# Patient Record
Sex: FEMALE | Race: BLACK OR AFRICAN AMERICAN | URBAN - METROPOLITAN AREA
[De-identification: names, ages, dates, MRNs, and addresses within clinical notes are randomized per-mention and may not be internally consistent; named-entity substitution may affect disease eponyms.]

---

## 2019-08-01 ENCOUNTER — OFFICE VISIT (OUTPATIENT)
Dept: ORTHOPEDIC SURGERY | Facility: CLINIC | Age: 39
End: 2019-08-01

## 2019-08-01 VITALS
OXYGEN SATURATION: 100 % | HEART RATE: 87 BPM | DIASTOLIC BLOOD PRESSURE: 90 MMHG | SYSTOLIC BLOOD PRESSURE: 133 MMHG | HEIGHT: 70 IN | BODY MASS INDEX: 27.8 KG/M2 | TEMPERATURE: 98.1 F | WEIGHT: 194.2 LBS | RESPIRATION RATE: 18 BRPM

## 2019-08-01 DIAGNOSIS — M25.551 PAIN IN RIGHT HIP: ICD-10-CM

## 2019-08-01 DIAGNOSIS — M25.651 DECREASED RANGE OF RIGHT HIP MOVEMENT: ICD-10-CM

## 2019-08-01 DIAGNOSIS — M70.61 TROCHANTERIC BURSITIS OF RIGHT HIP: Primary | ICD-10-CM

## 2019-08-01 RX ORDER — TRIAMCINOLONE ACETONIDE 40 MG/ML
40 INJECTION, SUSPENSION INTRA-ARTICULAR; INTRAMUSCULAR ONCE
Qty: 1 ML | Refills: 0
Start: 2019-08-01 | End: 2019-08-01

## 2019-08-01 NOTE — PROGRESS NOTES
HISTORY OF PRESENT ILLNESS:  Lilly Hernandez is a pleasant, 69-year-old, -American female who presents to the office with a one-week history of worsening right hip pain. While she was at work on July 26, 2019, working at the [employer], she developed pain over the outer portion of her right hip. There was no trauma reported. She did follow with [address]  and had x-rays done of the right hip on July 26, 2019, that were normal with no stress fracture suspected and no osseous lesions determined. She proceeded to take Motrin as provided by the ER physician for her symptoms that really did not help. On July 29, 2019, as she had not experienced any significant relief from her discomfort, she presented to the local [facility] and was examined to find no abnormalities associated with the right hip externally. She was placed on a Medrol Dosepak and also recommended nonsteroidals. She has some difficulties with the Medrol Dosepak causing nausea and was given what she thinks is Promethazine/Phenergan for her nausea/vomiting. She has not taken the Prednisone as directed and essentially has maintained symptoms of dull and achy sensation at rest to the right hip, and with activity the pain increases to upwards of an 8-9/10. She has pain when she sits from a standing position and stands from a seated position. FAMILY HISTORY:  Noncontributory. ALLERGIES:  None. REVIEW OF SYSTEMS:  No chest pain. No shortness of breath. No fever, chills, or night sweats. No rash, no itching. No nausea and no vomiting. Pain to the right hip is noted in the HPI. PHYSICAL EXAM:  She is a healthy-appearing, well-developed, well-nourished, pleasant, 69-year-old, -American female atraumatic, normocephalic, alert, and oriented times three, sitting on the table comfortably. She lies left recumbent facing the doorway with no problems.   I am joined by Che Beach, as my chaperone. The patient's  is present. Examination to the right hip reveals the skin is intact. There is no evidence of skin breakdown or infection. There is point tenderness over the greater trochanteric region with radiation distal through the IT band measuring 10 centimeters from the point of maximal tenderness. Her passive internal and external rotation of the right hip is noted at 8ø and 12ø with pain reproduced in both planes of motion to the point of maximal tenderness. Distal sensation is intact fully to the right lower extremity. Capillary refill is brisk and less than 2 seconds to the right lower extremity. RADIOGRAPHS:  X-rays from imaging done, review of report dated July 26, 2019, at [facility] reveal intact and normally aligned hip. No fractures or dislocations. Soft tissues are unremarkable. IMPRESSION:      1. Trochanteric bursitis right hip. 2. Overuse syndrome of the right hip. 3. Decreased range of motion of the right hip secondary to above. PROCEDURE:  Today, using sterile technique after verbal and written consent were obtained and a brief time out performed, with the patient lying left recumbent facing outwards to the door, and as I am joined by Janet Hale LPN, as my chaperone, and with the right hip draped accordingly and the hip identified at the point of maximal tenderness 1 cc of Kenalog at 40 mg per mL  mixed with 7 mL of Sensorcaine 0.75% was injected. There were no complications. The patient tolerated the procedure well. PLAN:   I am currently recommending a low-dose cortisone injection for symptoms of trochanteric bursitis of the right hip. We are going to follow her back on a prn basis. Trochanteric bursitis stretching and home exercise program were ordered today, and a note for work to remain out until August 12, 2019, was written. Today, all of her and her 's questions were answered to their satisfaction.

## 2019-08-07 ENCOUNTER — DOCUMENTATION ONLY (OUTPATIENT)
Dept: ORTHOPEDIC SURGERY | Facility: CLINIC | Age: 39
End: 2019-08-07

## 2019-08-07 NOTE — PROGRESS NOTES
Patient dropped off Healthcare Provider Request Form to be completed and faxed to [phone] at [facility] office. Patient can be reached at [phone].

## 2019-08-13 ENCOUNTER — TELEPHONE (OUTPATIENT)
Dept: ORTHOPEDIC SURGERY | Facility: CLINIC | Age: 39
End: 2019-08-13

## 2019-08-13 RX ORDER — DICLOFENAC SODIUM 75 MG/1
75 TABLET, DELAYED RELEASE ORAL 2 TIMES DAILY
Qty: 60 TAB | Refills: 1 | Status: SHIPPED | OUTPATIENT
Start: 2019-08-13

## 2019-08-13 NOTE — TELEPHONE ENCOUNTER
Patient calling to inform Lauren Vince that she is starting to feel a little more pain in her rt hip. The Motrin 600mg which she has been taking for a long time is not relieving her pain now. Patient cannot take [address] which she has taken in the past as it affects her stomach, very nauseated  Can a different pain medication be prescribed for her. Her pharmacy is [facility] [phone].  Also the physical therapy is not scheduled to start till 08/19, but on a waiting list. Please call her back at [phone]

## 2019-08-19 ENCOUNTER — HOSPITAL ENCOUNTER (OUTPATIENT)
Dept: PHYSICAL THERAPY | Age: 39
Discharge: HOME | End: 2019-08-19
Payer: COMMERCIAL

## 2019-08-19 PROCEDURE — 97161 PT EVAL LOW COMPLEX 20 MIN: CPT

## 2019-08-19 PROCEDURE — 97110 THERAPEUTIC EXERCISES: CPT

## 2019-08-19 NOTE — PROGRESS NOTES
PT DAILY TREATMENT NOTE/HIP EVAL 10-18    Patient Name: Zeynep Tran  Date:2019  : 1980  [x]  Patient  Verified  Payor: BLUE CROSS / Plan: [facility] / Product Type: PPO /    In time:901  Out time:938  Total Treatment Time (min): 37  Visit #: 1 of 8    Medicare/BCBS Only   Total Timed Codes (min):  37 1:1 Treatment Time:  23     Treatment Area: Pain in right hip [M25.551]    SUBJECTIVE  Pain Level (0-10 scale): current = 2/10, worst = 3-4/10, best = 0-1/10  Aggravating = sitting a long time, prolonged standing/walking  Alleviating = stretching   []constant []intermittent []improving []worsening []no change since onset    Any medication changes, allergies to medications, adverse drug reactions, diagnosis change, or new procedure performed?: [x] No    [] Yes (see summary sheet for update)  Subjective functional status/changes:     HPI: Pt reports to PT with right hip pain that began 19. Pt had recently changed jobs to working in an [employer], doubling her shift length from [address] to [address]. Pt was at work when the pain began and thought that maybe it was from her knee that has \"suble OA. \" Pt began wearing her knee brace to manage her pain, but it began radiating up and down her right leg. After a few days, pt had a break during work and decided to leave due to pain. Pt went to ER where she was given 600mg of motrin, xray, and a release to work in 2 days. Pt felt that she was not going to be able to go back to work at that time, so she followed up with the MD. Mark Baires thought I wanted stronger medication but I just wanted a referred to a specialist.\"  Pt then went to urgent care who diagnosed her with bursitis - they gave her a \"steroid pack and norco.\" She was not able to take either and threw up the norco.  On  pt went to see Gera Camejo, who stopped the steroids and gave her a Cortizone injection in the hip which made her feel better a few days.  Pt reports by the following Sunday she was able to walk without her cane. Pt states prolonged driving/sitting still causes stiffness and pain in the leg - she will need to get out of the car or stand up from sewing to stretch which helps alleviate the discomfort. Pt now stretches in the AM, PM and throughout the day. Pt has resigned from [employer] and will be  starting at [employer] on 8/23/19 and will do mostly sitting work     PMHx/Surgical Hx: none  Work Hx: between jobs  Pt Goals: \"learn how to keep this from reoccurring\"    OBJECTIVE/EXAMINATION    14 min [x]Eval                  []Re-Eval       23 min Therapeutic Exercise:  [x] See flow sheet : explanation, demonstration, performance and distribution of HEP   Rationale: increase ROM and increase strength to improve the patients ability to improve ability to perform work duties.           With   [] TE   [] TA   [] neuro   [] other: Patient Education: [x] Review HEP    [] Progressed/Changed HEP based on:   [] positioning   [] body mechanics   [] transfers   [] heat/ice application    [] other:      Physical Therapy Evaluation - HIP     Gait: WNL    ROM / Strength                            Strength (1-5/5)           AROM   PROM    Right Left Right Left Right Left   Hip Flexion 4 4        ER 4 4        IR 4 4        Abduction 4 4        Adduction 4 4        Extension 4 4       Knee Extension 5- 5-        Flexion 4 4       Ankle Dorsiflexion 5 5        Plantarflexion         NOTES:  Hip AROM WFL without pain    Flexibility:    Ely = positive bilaterally for quad tightness   90-90 Popliteal angle = negative bilaterally for hamstring tightness    Palpation:   TTP through right gluts - over piriformis and ER group; no TTP over greater trochanter or TFL    Special Tests:   Right Left   Gillet negative negative   Long Sit      Scour negative negative   OSCAR negative negative   FADIR negative negative   Arturo's                    Pain Level (0-10 scale) post treatment: 0    ASSESSMENT/Changes in Function: see POC    Patient will continue to benefit from skilled PT services to modify and progress therapeutic interventions, address functional mobility deficits, address ROM deficits, address strength deficits, analyze and address soft tissue restrictions, analyze and cue movement patterns, analyze and modify body mechanics/ergonomics and assess and modify postural abnormalities to attain remaining goals. []  See Plan of Care  []  See progress note/recertification  []  See Discharge Summary         Progress towards goals / Updated goals:  Short Term Goals: To be accomplished in 2 weeks:  1. Pt will demonstrate (I) with HEP as evidenced by performance in clinic with minimal instruction in order to supplement PT treatment   Eval = established  2. Pt will report ability to sit for 1 hour with reports of max 2/10 pain. Eval = ~40minutes    Long Term Goals: To be accomplished in 8 treatments:  1. Pt will demonstrate bilateral hip abduction/extension strength minimum 5-/5 in order to improve ambulation distances in the community    Eval: bilaterally = 4/5 for ext and abduction  2. Pt will score at least 78 on FOTO in order to improve overall function, decrease pain, and facilitate return to PLOF. Eval = 67  3. Pt will demonstrate negative ely stretch on the right  in order to reduce static tension across hip to improve ability to tolerate prolonged sitting/standing.     Eval = positive bilaterally     PLAN  [x]  Upgrade activities as tolerated     [x]  Continue plan of care  []  Update interventions per flow sheet       []  Discharge due to:_  []  Other:_      Cortney Lowery, PT 8/19/2019  9:01 AM

## 2019-08-19 NOTE — PROGRESS NOTES
[facility] [place] [address]  [address] [facility] [address], [address] [phone] fax    Plan of Care/ Statement of Necessity for Physical Therapy Services           Patient name: Felicity Acosta Start of Care: 2019   Referral source: Teagan Etienne : 1980    Medical Diagnosis: Pain in right hip [M25.551]  Payor: BLUE CROSS / Plan: [address] [facility] [place] [address] / Product Type: PPO /  Onset Date:19    Treatment Diagnosis: right hip pain   Prior Hospitalization: see medical history Provider#: [id number]   Medications: Verified on Patient summary List    Comorbidities: none   Prior Level of Function: functionally (I); previously worked in an [employer] 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 Assessment/ key information:   Ms. Bang Monzon is a 43yo female who presents to PT with complaints of right hip pain and diagnosis of greater trochanteric bursitis. Pt received an injection on 19 that has managed the majority of her symptoms, but pt continues to demonstrate decreased hip strength and flexibility with piriformis tenderness to palpation at evaluation today. Pt continues to have increased pain with prolonged sitting during driving and sewing.  Pt will benefit from skilled PT in order to address listed deficits, decrease pain, and maximize functional potential.    Evaluation Complexity History LOW Complexity : Zero comorbidities / personal factors that will impact the outcome / POC; Examination LOW Complexity : 1-2 Standardized tests and measures addressing body structure, function, activity limitation and / or participation in recreation  ;Presentation LOW Complexity : Stable, uncomplicated  ;Clinical Decision Making MEDIUM Complexity : FOTO score of 26-74  Overall Complexity Rating: LOW   Problem List: pain affecting function, decrease ROM, decrease strength, decrease ADL/ functional abilitiies, decrease activity tolerance and decrease flexibility/ joint mobility   Treatment Plan may include any combination of the following: Therapeutic exercise, Therapeutic activities, Neuromuscular re-education, Physical agent/modality, Gait/balance training, Manual therapy, Patient education and Self Care training  Patient / Family readiness to learn indicated by: asking questions, trying to perform skills and interest  Persons(s) to be included in education: patient (P)  Barriers to Learning/Limitations: None  Patient Goal (s): Arcelia Right how to prevent this from reoccurring  Patient Self Reported Health Status: good  Rehabilitation Potential: excellent  Short Term Goals: To be accomplished in 2 weeks:  1. Pt will demonstrate (I) with HEP as evidenced by performance in clinic with minimal instruction in order to supplement PT treatment   Eval = established  2. Pt will report ability to sit for 1 hour with reports of max 2/10 pain. Eval = ~40minutes    Long Term Goals: To be accomplished in 8 treatments:  1. Pt will demonstrate bilateral hip abduction/extension strength minimum 5-/5 in order to improve ambulation distances in the community    Eval: bilaterally = 4/5 for ext and abduction  2. Pt will score at least 78 on FOTO in order to improve overall function, decrease pain, and facilitate return to PLOF. Eval = 67  3. Pt will demonstrate negative ely stretch on the right  in order to reduce static tension across hip to improve ability to tolerate prolonged sitting/standing. Eval = positive bilaterally       Frequency / Duration: Patient to be seen 2 times per week for 8 treatments.     Patient/ Caregiver education and instruction: Diagnosis, prognosis, self care, activity modification, brace/ splint application and exercises   [x]  Plan of care has been reviewed with JOAQUIN Corrales PT 8/19/2019 9:01 AM    ________________________________________________________________________    I certify that the above Therapy Services are being furnished while the patient is under my care. I agree with the treatment plan and certify that this therapy is necessary.     Physician's Signature:____________Date:_________TIME:________    ** Signature, Date and Time must be completed for valid certification **    Please sign and return to [facility] [address]  [address], [address] [phone] fax

## 2019-08-21 ENCOUNTER — HOSPITAL ENCOUNTER (OUTPATIENT)
Dept: PHYSICAL THERAPY | Age: 39
Discharge: HOME | End: 2019-08-21
Payer: COMMERCIAL

## 2019-08-21 PROCEDURE — 97110 THERAPEUTIC EXERCISES: CPT

## 2019-08-21 PROCEDURE — 97112 NEUROMUSCULAR REEDUCATION: CPT

## 2019-08-21 NOTE — PROGRESS NOTES
PT DAILY TREATMENT NOTE 10-18    Patient Name: Hudson Gloria  Date:2019  : 1980  [x]  Patient  Verified  Payor: BLUE CROSS / Plan: [employer] [facility] / Product Type: PPO /    In time: 321 Out time: 415  Total Treatment Time (min): 54  Visit #: 2 of 8    Medicare/BCBS Only   Total Timed Codes (min):  54 1:1 Treatment Time:  39       Treatment Area: Pain in right hip [M25.551]    SUBJECTIVE  Pain Level (0-10 scale): 0  Any medication changes, allergies to medications, adverse drug reactions, diagnosis change, or new procedure performed?: [x] No    [] Yes (see summary sheet for update)  Subjective functional status/changes:   [] No changes reported  Pt notes no pain at the start of today's treatment. OBJECTIVE      24 min Therapeutic Exercise:  [x] See flow sheet :   Rationale: increase ROM and increase strength to improve the patients ability to improve ease with ADls    30 min Neuromuscular Re-education:  [x]  See flow sheet :gluteal activation exercises    Rationale: increase strength, improve coordination and increase proprioception  to improve the patients ability to increase activity tolerance           With   [] TE   [] TA   [] neuro   [] other: Patient Education: [x] Review HEP    [] Progressed/Changed HEP based on:   [] positioning   [] body mechanics   [] transfers   [] heat/ice application    [] other:           Pain Level (0-10 scale) post treatment: 0    ASSESSMENT/Changes in Function: Initiated exercises per POC. Pt reported no increased pain during or following treatment session.     Patient will continue to benefit from skilled PT services to modify and progress therapeutic interventions, address functional mobility deficits, address ROM deficits, address strength deficits, analyze and address soft tissue restrictions, analyze and cue movement patterns, analyze and modify body mechanics/ergonomics, assess and modify postural abnormalities, address imbalance/dizziness and instruct in home and community integration to attain remaining goals. [x]  See Plan of Care  []  See progress note/recertification  []  See Discharge Summary         Progress towards goals / Updated goals:  Short Term Goals: To be accomplished in 2 weeks:  1. Pt will demonstrate (I) with HEP as evidenced by performance in clinic with minimal instruction in order to supplement PT treatment              Eval = established  2. Pt will report ability to sit for 1 hour with reports of max 2/10 pain. Eval = ~40minutes     Long Term Goals: To be accomplished in 8 treatments:  1. Pt will demonstrate bilateral hip abduction/extension strength minimum 5-/5 in order to improve ambulation distances in the community               Eval: bilaterally = 4/5 for ext and abduction  2. Pt will score at least 78 on FOTO in order to improve overall function, decrease pain, and facilitate return to PLOF. Eval = 67  3. Pt will demonstrate negative ely stretch on the right  in order to reduce static tension across hip to improve ability to tolerate prolonged sitting/standing.                Eval = positive bilaterally     PLAN  [x]  Upgrade activities as tolerated     [x]  Continue plan of care  []  Update interventions per flow sheet       []  Discharge due to:_  []  Other:_      Brent Torres 8/21/2019  3:26 PM    Future Appointments   Date Time Provider Susan Delatorre   8/21/2019  3:30 PM Serena [facility]

## 2019-08-26 ENCOUNTER — APPOINTMENT (OUTPATIENT)
Dept: PHYSICAL THERAPY | Age: 39
End: 2019-08-26
Payer: COMMERCIAL

## 2019-08-30 ENCOUNTER — HOSPITAL ENCOUNTER (OUTPATIENT)
Dept: PHYSICAL THERAPY | Age: 39
Discharge: HOME | End: 2019-08-30
Payer: COMMERCIAL

## 2019-08-30 PROCEDURE — 97140 MANUAL THERAPY 1/> REGIONS: CPT | Performed by: PHYSICAL THERAPIST

## 2019-08-30 PROCEDURE — 97110 THERAPEUTIC EXERCISES: CPT | Performed by: PHYSICAL THERAPIST

## 2019-08-30 NOTE — PROGRESS NOTES
PT DAILY TREATMENT NOTE 10-18    Patient Name: Hudson Gloria  Date:2019  : 1980  [x]  Patient  Verified  Payor: Gratn Hurd / Plan: [employer] [facility] [employer] / Product Type: PPO /    In time:8:00  Out time:8:46  Total Treatment Time (min): [address]  Visit #: 3 of 8    Medicare/BCBS Only   Total Timed Codes (min):  46 1:1 Treatment Time:  30       Treatment Area: Pain in right hip [M25.551]    SUBJECTIVE  Pain Level (0-10 scale): 0/10  Any medication changes, allergies to medications, adverse drug reactions, diagnosis change, or new procedure performed?: [x] No    [] Yes (see summary sheet for update)  Subjective functional status/changes:   [] No changes reported  Patient states she had increased pain for over a day after her last visit. Feels she did too much and noted she had right hip pain also. Doing better today. OBJECTIVE    16 min Therapeutic Exercise:  [] See flow sheet :   Rationale: increase ROM and increase strength to improve the patients ability to increase their functional activity level. 20 min Neuromuscular Re-education:  []  See flow sheet :   Rationale: increase strength, improve coordination and increase proprioception  to improve the patients ability to increase patients ADLs. 10 min Manual Therapy:  Manual stretching HS, ITB, piriformis. Rationale: decrease pain, increase ROM and increase tissue extensibility to increase ease of motion to improve function. With   [] TE   [] TA   [] neuro   [] other: Patient Education: [x] Review HEP    [] Progressed/Changed HEP based on:   [] positioning   [] body mechanics   [] transfers   [] heat/ice application    [] other:      Other Objective/Functional Measures: Patient has normal gait. She has no tenderness with stretching of HS, ITB. Pain Level (0-10 scale) post treatment: 0/10    ASSESSMENT/Changes in Function:  Patient with decreased pain. Able to tolerate all stretching and exercises.     Patient will continue to benefit from skilled PT services to modify and progress therapeutic interventions, address functional mobility deficits, address strength deficits, analyze and address soft tissue restrictions and analyze and cue movement patterns to attain remaining goals. [x]  See Plan of Care  []  See progress note/recertification  []  See Discharge Summary         Progress towards goals / Updated goals:  Short Term Goals: To be accomplished in 2 weeks: 1.   Pt will demonstrate (I) with HEP as evidenced by performance in clinic with minimal instruction in order to supplement PT treatment              Eval = established  2.   Pt will report ability to sit for 1 hour with reports of max 2/10 pain.               Eval = ~40minutes   Current:  15 minutes then gets up from her sewing. 8/30/19. Regressing.     Long Term Goals: To be accomplished in 8 treatments:  1.   Pt will demonstrate bilateral hip abduction/extension strength minimum 5-/5 in order to improve ambulation distances in the community               Eval: bilaterally = 4/5 for ext and abduction  2.   Pt will score at least 78 on FOTO in order to improve overall function, decrease pain, and facilitate return to OF.               Eval = 67  3.   Pt will demonstrate negative ely stretch on the right  in order to reduce static tension across hip to improve ability to tolerate prolonged sitting/standing.               Eval = positive bilaterally     PLAN  [x]  Upgrade activities as tolerated     [x]  Continue plan of care  []  Update interventions per flow sheet       []  Discharge due to:_  []  Other:_      Mini Espinosa, PT 8/30/2019  7:52 AM    Future Appointments   Date Time Provider Susan Delatorre   8/30/2019  8:00 AM Major Liss, PT [facility]

## 2019-09-03 NOTE — PROGRESS NOTES
[facility]              [address]        [place], [address]   [phone] fax    Discharge Summary  Patient name: Hansa Mary Start of Care: 2019   Referral source: Milissa Severs : 1980   Medical/Treatment Diagnosis: Pain in right hip [M25.551]  Payor: Meaghan Bill / Plan: [facility] [place] / Product Type: PPO /  Onset Date:2019     Prior Hospitalization: see medical history Provider#: [id number]   Medications: Verified on Patient Summary List    Comorbidities: none   Prior Level of Function: functionally (I); previously worked in 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   Pt will demonstrate (I) with HEP as evidenced by performance in clinic with minimal instruction in order to supplement PT treatment              Eval = established              Current:  Patient reports compliance with her HEP.  19. 2.   Pt will report ability to sit for 1 hour with reports of max 2/10 pain.               Eval = ~40minutes              Current:  15 minutes then gets up from her sewing. 19. Regressing.     Long Term Goals: To be accomplished in 8 treatments:  1.   Pt will demonstrate bilateral hip abduction/extension strength minimum 5-/5 in order to improve ambulation distances in the community               Eval: bilaterally = 4/5 for ext and abduction  2.   Pt will score at least 78 on FOTO in order to improve overall function, decrease pain, and facilitate return to PLOF.               Eval = 67  3.   Pt will demonstrate negative ely stretch on the right  in order to reduce static tension across hip to improve ability to tolerate prolonged sitting/standing.               Eval = positive bilaterally     Goals have not been met. Patient reported to front office that she did not realize she had to pay a copay with each visit. She has opted to forego therapy at this time. ASSESSMENT/RECOMMENDATIONS:  [x]Discontinue therapy: []Patient has reached or is progressing toward set goals      [x]Patient has abdicated therapy due to copay.       []Due to lack of appreciable progress towards set goals    Dalia Leon, PT 9/3/2019 9:54 AM

## 2020-01-05 NOTE — LETTER
NOTIFICATION RETURN TO WORK / SCHOOL 
 
8/1/2019 2:29 PM 
 
Ms. Mitch Russell [address] [place] [address] To Whom It May Concern: 
 
Mitch Russell is currently under the care of [address] [place] [address]. She will return to work full duty no restrictions on 8-12-19. If there are questions or concerns please have the patient contact our office.  
 
 
 
Sincerely, 
 
 
Herson Stevens PA-C 
 
                                
 
 1. After being discharged, please follow up with your PMD, Dr. Rocha.

## 2023-04-03 ENCOUNTER — TELEPHONE (OUTPATIENT)
Age: 43
End: 2023-04-03

## 2023-04-03 NOTE — TELEPHONE ENCOUNTER
Message left on [facility] voicemail 3/31 @ 2:40:    Message said patient has referral to [employer] office, returned call today and left voicemail to call us back.

## 2023-04-17 ENCOUNTER — OFFICE VISIT (OUTPATIENT)
Age: 43
End: 2023-04-17
Payer: COMMERCIAL

## 2023-04-17 VITALS — WEIGHT: 213 LBS | TEMPERATURE: 98.2 F | BODY MASS INDEX: 29.82 KG/M2 | HEIGHT: 71 IN

## 2023-04-17 DIAGNOSIS — M25.551 RIGHT HIP PAIN: ICD-10-CM

## 2023-04-17 DIAGNOSIS — E66.3 OVERWEIGHT (BMI 25.0-29.9): ICD-10-CM

## 2023-04-17 DIAGNOSIS — M54.59 MECHANICAL LOW BACK PAIN: Primary | ICD-10-CM

## 2023-04-17 DIAGNOSIS — M46.1 SACROILIITIS (HCC): ICD-10-CM

## 2023-04-17 DIAGNOSIS — M54.9 OTHER ACUTE BACK PAIN: ICD-10-CM

## 2023-04-17 PROCEDURE — 99214 OFFICE O/P EST MOD 30 MIN: CPT | Performed by: PHYSICIAN ASSISTANT

## 2023-04-17 PROCEDURE — 72170 X-RAY EXAM OF PELVIS: CPT | Performed by: PHYSICIAN ASSISTANT

## 2023-04-17 RX ORDER — DICLOFENAC SODIUM 75 MG/1
75 TABLET, DELAYED RELEASE ORAL 2 TIMES DAILY
Qty: 60 TABLET | Refills: 3 | Status: SHIPPED | OUTPATIENT
Start: 2023-04-17

## 2023-04-18 ENCOUNTER — TELEPHONE (OUTPATIENT)
Facility: HOSPITAL | Age: 43
End: 2023-04-18

## 2023-04-18 NOTE — PROGRESS NOTES
maintenance. I have asked the patient to schedule an appointment with their primary care provider for follow-up on general health maintenance concerns. Today all the patient's questions were answered to their satisfaction. Copies of x-rays reviewed if obtained this visit, and provided to patient. Dictation disclaimer:  Please note that this dictation was completed with \"Dragon\", the computer voice recognition software. Today's exam was dictated using fluency dictation software (voice recognition software). Occasionally, sound-a-like computer induced   dictation errors will populate the report. Quite often unanticipated grammatical, syntax, homophones, and other interpretive errors are inadvertently transcribed by the computer software. Please disregard these errors. Please excuse any errors that have escaped final proofreading. Mallory MCLAUGHLIN, APC, MPAS, PA-C  [facility]

## 2023-05-17 ENCOUNTER — TELEPHONE (OUTPATIENT)
Age: 43
End: 2023-05-17

## 2023-05-17 DIAGNOSIS — M54.59 MECHANICAL LOW BACK PAIN: Primary | ICD-10-CM

## 2023-05-17 DIAGNOSIS — M25.551 RIGHT HIP PAIN: ICD-10-CM

## 2023-05-17 DIAGNOSIS — M54.9 OTHER ACUTE BACK PAIN: ICD-10-CM

## 2023-05-17 DIAGNOSIS — M46.1 SACROILIITIS (HCC): ICD-10-CM

## 2023-05-17 RX ORDER — METHYLPREDNISOLONE 4 MG/1
TABLET ORAL
Qty: 1 KIT | Refills: 0 | Status: SHIPPED | OUTPATIENT
Start: 2023-05-17 | End: 2023-05-23

## 2023-07-25 ENCOUNTER — HOSPITAL ENCOUNTER (OUTPATIENT)
Facility: HOSPITAL | Age: 43
Setting detail: RECURRING SERIES
Discharge: HOME | End: 2023-07-28
Payer: COMMERCIAL

## 2023-07-25 PROCEDURE — 97110 THERAPEUTIC EXERCISES: CPT

## 2023-07-25 PROCEDURE — 97161 PT EVAL LOW COMPLEX 20 MIN: CPT

## 2023-07-25 NOTE — THERAPY EVALUATION
PT DAILY TREATMENT NOTE/LUMBAR EVAL     Patient Name: Elly Guardado    Date: 2023    : 1980  Insurance: Payor: Kota Sessions / Plan: Deena Haynes / Product Type: *No Product type* /      Patient  verified yes     Visit #   Current / Total 1 8   Time   In / Out 143 221   Pain   In / Out 6 4   Subjective Functional Status/Changes: Pt first noted back pain in February. She notes relief with supine flexion. She asked to see an ortho who did imaging an noted a pulled muscle. Pt had a baby a year ago in July. She has 3 other kids. She notes the pain is inconsistent. Pt notes an increase of pain with sitting for too long and laying on her left side. She reports her back pain gets worse when she's on her period. Treatment Area: Other low back pain [M54.59]  Right hip pain [M25.551]  Left hip pain [M25.552]  SUBJECTIVE  Pain Level (0-10 scale): 6/10  []constant [x]intermittent []improving []worsening []no change since onset    Any medication changes, allergies to medications, adverse drug reactions, diagnosis change, or new procedure performed?: [x] No    [] Yes (see summary sheet for update)  Subjective functional status/changes:     PLOF: Able to sit without pain and lay on left side. She was able to bend without feeling like she had to be \"cautious. \" Pt could cross her legs without discomfort. Pt could stand for 3 hours without pain to complete a work shift. Limitations to PLOF: Difficulty sitting, standing and laying on her side. Mechanism of Injury: Insidious   Current symptoms/Complaints: A deep ache  Previous Treatment/Compliance: Physical therapy   PMHx/Surgical Hx:  2022. Work Hx: Works in a pharmacy. Living Situation: 2 story detached home. Pt Goals: \"To be pain free. \"     OBJECTIVE/EXAMINATION  Activity/Recreational Limitations: pt likes to complete crafts, but cannot sit for longer periods of time. Mobility: Can walk up to 1 hour.    Self Care: difficulty

## 2023-07-25 NOTE — THERAPY EVALUATION
[address] [facility]  [address], [address] [address] Ph: [phone] Fx: [phone]  Plan of Care / Statement of Necessity for Physical Therapy Services     Patient Name: Luis Corbett : 1980   Medical   Diagnosis: Other low back pain [M54.59]  Right hip pain [M25.551]  Left hip pain [M25.552] Treatment Diagnosis:   M54.59  OTHER LOWER BACK PAIN    Onset Date: 2023     Referral Source: Natasha Campo MD [facility]): 2023   Prior Hospitalization: See medical history Provider #: [id number]   Prior Level of Function: Able to sit for prolonged periods of time, able to stand for 3 hours to complete a work shift and able to lay on her left side when sleeping. Comorbidities:      Assessment / key information:  Pt presents with an onset of back pain that started in 2023. Her pain is intermittent, but worsens with prolonged sitting, laying on her side and when menstruating. Pt presents with mild hip strength deficits. Pt has a hx of 4 child births one of which was a . She reports difficulty holding her bladder.      Evaluation Complexity:  History:  MEDIUM  Complexity : 1-2 comorbidities / personal factors will impact the outcome/ POC ; Examination:  LOW Complexity : 1-2 Standardized tests and measures addressing body structure, function, activity limitation and / or participation in recreation  ;Presentation:  LOW Complexity : Stable, uncomplicated  ;Clinical Decision Making:  MEDIUM Complexity : FOTO score of 26-74 FOTO score = an established functional score where 100 = no disability  Overall Complexity Rating: LOW   Problem List: pain affecting function, decrease strength, impaired gait/balance, decrease ADL/functional abilities, decrease activity tolerance, and decrease transfer abilities    Treatment Plan may include any combination of the followin Therapeutic Exercise, [address] Neuromuscular Re-Education, [address] Manual

## 2023-08-02 ENCOUNTER — TELEPHONE (OUTPATIENT)
Facility: HOSPITAL | Age: 43
End: 2023-08-02

## 2023-08-02 NOTE — TELEPHONE ENCOUNTER
Called patient to schedule follow up appts. Patient states that she will like to hold off and requested that I call back next week.

## 2023-08-09 ENCOUNTER — TELEPHONE (OUTPATIENT)
Facility: HOSPITAL | Age: 43
End: 2023-08-09

## 2023-08-09 NOTE — TELEPHONE ENCOUNTER
Called patient to schedule follow up visits. Patient requested to be placed on a 30 day hold. She is aware that her chart will be discharged after 8/24.